# Patient Record
Sex: FEMALE | Race: WHITE | NOT HISPANIC OR LATINO | Employment: UNEMPLOYED | URBAN - METROPOLITAN AREA
[De-identification: names, ages, dates, MRNs, and addresses within clinical notes are randomized per-mention and may not be internally consistent; named-entity substitution may affect disease eponyms.]

---

## 2018-03-20 PROBLEM — S83.412A SPRAIN OF MEDIAL COLLATERAL LIGAMENT OF LEFT KNEE, INITIAL ENCOUNTER: Status: ACTIVE | Noted: 2018-03-20

## 2018-04-26 PROBLEM — S83.412D SPRAIN OF MEDIAL COLLATERAL LIGAMENT OF LEFT KNEE, SUBSEQUENT ENCOUNTER: Status: ACTIVE | Noted: 2018-04-26

## 2020-11-05 DIAGNOSIS — Z01.812 PRE-PROCEDURAL LABORATORY EXAMINATIONS: Primary | ICD-10-CM

## 2020-11-13 ENCOUNTER — LAB SERVICES (OUTPATIENT)
Dept: LAB | Age: 70
End: 2020-11-13

## 2020-11-13 DIAGNOSIS — Z01.812 PRE-PROCEDURAL LABORATORY EXAMINATIONS: ICD-10-CM

## 2020-11-13 PROCEDURE — U0003 INFECTIOUS AGENT DETECTION BY NUCLEIC ACID (DNA OR RNA); SEVERE ACUTE RESPIRATORY SYNDROME CORONAVIRUS 2 (SARS-COV-2) (CORONAVIRUS DISEASE [COVID-19]), AMPLIFIED PROBE TECHNIQUE, MAKING USE OF HIGH THROUGHPUT TECHNOLOGIES AS DESCRIBED BY CMS-2020-01-R: HCPCS | Performed by: INTERNAL MEDICINE

## 2020-11-15 LAB
SARS-COV-2 RNA RESP QL NAA+PROBE: NOT DETECTED
SERVICE CMNT-IMP: NORMAL
SPECIMEN SOURCE: NORMAL

## 2020-11-16 ENCOUNTER — ANESTHESIA EVENT (OUTPATIENT)
Dept: GASTROENTEROLOGY | Age: 70
End: 2020-11-16

## 2020-11-16 ENCOUNTER — ANESTHESIA (OUTPATIENT)
Dept: GASTROENTEROLOGY | Age: 70
End: 2020-11-16

## 2020-11-16 ENCOUNTER — HOSPITAL ENCOUNTER (OUTPATIENT)
Dept: GASTROENTEROLOGY | Age: 70
Discharge: HOME | End: 2020-11-16
Attending: INTERNAL MEDICINE

## 2020-11-16 VITALS
HEART RATE: 66 BPM | DIASTOLIC BLOOD PRESSURE: 78 MMHG | HEIGHT: 64 IN | WEIGHT: 165 LBS | RESPIRATION RATE: 18 BRPM | TEMPERATURE: 96.8 F | BODY MASS INDEX: 28.17 KG/M2 | SYSTOLIC BLOOD PRESSURE: 154 MMHG | OXYGEN SATURATION: 100 %

## 2020-11-16 DIAGNOSIS — Z12.11 SPECIAL SCREENING FOR MALIGNANT NEOPLASMS, COLON: ICD-10-CM

## 2020-11-16 PROCEDURE — 10002800 HB RX 250 W HCPCS: Performed by: STUDENT IN AN ORGANIZED HEALTH CARE EDUCATION/TRAINING PROGRAM

## 2020-11-16 PROCEDURE — 10002807 HB RX 258: Performed by: STUDENT IN AN ORGANIZED HEALTH CARE EDUCATION/TRAINING PROGRAM

## 2020-11-16 PROCEDURE — 10002801 HB RX 250 W/O HCPCS: Performed by: STUDENT IN AN ORGANIZED HEALTH CARE EDUCATION/TRAINING PROGRAM

## 2020-11-16 PROCEDURE — 13000001 HB PHASE II RECOVERY EA 30 MINUTES

## 2020-11-16 PROCEDURE — 13000028 HB GI MAJOR COMPLEX CASE S/U + 1ST 15 MIN

## 2020-11-16 PROCEDURE — 13000029 HB GI MAJOR COMPLEX CASE EA ADD MINUTE

## 2020-11-16 PROCEDURE — 88305 TISSUE EXAM BY PATHOLOGIST: CPT

## 2020-11-16 PROCEDURE — 88342 IMHCHEM/IMCYTCHM 1ST ANTB: CPT

## 2020-11-16 PROCEDURE — 13000008 HB ANESTHESIA MAC OUTSIDE OR

## 2020-11-16 RX ORDER — MONTELUKAST SODIUM 4 MG/1
4 TABLET, CHEWABLE ORAL NIGHTLY
COMMUNITY

## 2020-11-16 RX ORDER — ACETAMINOPHEN 325 MG/1
650 TABLET ORAL EVERY 4 HOURS PRN
Status: DISCONTINUED | OUTPATIENT
Start: 2020-11-16 | End: 2020-11-18 | Stop reason: HOSPADM

## 2020-11-16 RX ORDER — SODIUM CHLORIDE 9 MG/ML
INJECTION, SOLUTION INTRAVENOUS CONTINUOUS PRN
Status: DISCONTINUED | OUTPATIENT
Start: 2020-11-16 | End: 2020-11-16

## 2020-11-16 RX ORDER — LIDOCAINE HYDROCHLORIDE 20 MG/ML
INJECTION, SOLUTION INFILTRATION; PERINEURAL PRN
Status: DISCONTINUED | OUTPATIENT
Start: 2020-11-16 | End: 2020-11-16

## 2020-11-16 RX ORDER — PROPOFOL 10 MG/ML
INJECTION, EMULSION INTRAVENOUS PRN
Status: DISCONTINUED | OUTPATIENT
Start: 2020-11-16 | End: 2020-11-16

## 2020-11-16 RX ORDER — ACETAMINOPHEN 650 MG/1
650 SUPPOSITORY RECTAL EVERY 4 HOURS PRN
Status: DISCONTINUED | OUTPATIENT
Start: 2020-11-16 | End: 2020-11-18 | Stop reason: HOSPADM

## 2020-11-16 RX ORDER — SODIUM CHLORIDE, SODIUM LACTATE, POTASSIUM CHLORIDE, CALCIUM CHLORIDE 600; 310; 30; 20 MG/100ML; MG/100ML; MG/100ML; MG/100ML
10 INJECTION, SOLUTION INTRAVENOUS CONTINUOUS
Status: DISCONTINUED | OUTPATIENT
Start: 2020-11-16 | End: 2020-11-18 | Stop reason: HOSPADM

## 2020-11-16 RX ADMIN — SODIUM CHLORIDE: 9 INJECTION, SOLUTION INTRAVENOUS at 07:51

## 2020-11-16 RX ADMIN — PROPOFOL 50 MG: 10 INJECTION, EMULSION INTRAVENOUS at 08:01

## 2020-11-16 RX ADMIN — LIDOCAINE HYDROCHLORIDE 3 ML: 20 INJECTION, SOLUTION INFILTRATION; PERINEURAL at 08:01

## 2020-11-16 RX ADMIN — PROPOFOL 150 MCG/KG/MIN: 10 INJECTION, EMULSION INTRAVENOUS at 08:01

## 2020-11-16 ASSESSMENT — ACTIVITIES OF DAILY LIVING (ADL)
ADL_BEFORE_ADMISSION: INDEPENDENT
ADL_SCORE: 12
HISTORY OF FALLING IN THE LAST YEAR (PRIOR TO ADMISSION): NO

## 2020-11-16 ASSESSMENT — ENCOUNTER SYMPTOMS: EXERCISE TOLERANCE: GOOD (>4 METS)

## 2020-11-16 ASSESSMENT — PAIN SCALES - GENERAL
PAINLEVEL_OUTOF10: 0

## 2020-11-17 LAB — PATHOLOGIST NAME: NORMAL

## 2021-11-06 ENCOUNTER — HOSPITAL ENCOUNTER (OUTPATIENT)
Age: 71
Discharge: HOME | End: 2021-11-06
Payer: MEDICARE

## 2021-11-06 VITALS
BODY MASS INDEX: 27.83 KG/M2 | HEIGHT: 64 IN | TEMPERATURE: 98 F | RESPIRATION RATE: 20 BRPM | WEIGHT: 163 LBS | DIASTOLIC BLOOD PRESSURE: 69 MMHG | OXYGEN SATURATION: 100 % | HEART RATE: 77 BPM | SYSTOLIC BLOOD PRESSURE: 144 MMHG

## 2021-11-06 DIAGNOSIS — R53.83 FATIGUE, UNSPECIFIED TYPE: Primary | ICD-10-CM

## 2021-11-06 DIAGNOSIS — U07.1 COVID-19: ICD-10-CM

## 2021-11-06 PROCEDURE — 99214 OFFICE O/P EST MOD 30 MIN: CPT | Performed by: PHYSICIAN ASSISTANT

## 2021-11-06 PROCEDURE — M0243 CASIRIVI AND IMDEVI INFUSION: HCPCS | Performed by: PHYSICIAN ASSISTANT

## 2021-11-06 RX ORDER — ATORVASTATIN CALCIUM 10 MG/1
TABLET, FILM COATED ORAL
COMMUNITY
Start: 2021-07-15

## 2021-11-06 RX ORDER — FLUTICASONE FUROATE AND VILANTEROL TRIFENATATE 100; 25 UG/1; UG/1
1 POWDER RESPIRATORY (INHALATION) AS DIRECTED
COMMUNITY

## 2021-11-06 NOTE — ED QUICK NOTES
Patient tolerated infusion without any signs of allergic reaction. Patient discharged home in good condition.

## 2021-11-06 NOTE — ED PROVIDER NOTES
Patient Seen in: [facility]      History   Patient presents with:  Cough/URI    Stated Complaint: Covid    Subjective:   HPI    71 yo female here for polyclonal antibody infusion.   Patient has been experiencing mild URI symptoms for the last Cervical back: Normal range of motion. Skin:     General: Skin is warm. Neurological:      General: No focal deficit present. Mental Status: She is alert and oriented to person, place, and time.    Psychiatric:         Mood and Affect: Mood normal. List

## 2021-11-06 NOTE — ED INITIAL ASSESSMENT (HPI)
Patient is here with nasal congestion and a cough for the last five days and was tested positive for covid yesterday.

## 2023-02-17 ENCOUNTER — HOSPITAL ENCOUNTER (OUTPATIENT)
Age: 73
Discharge: HOME | End: 2023-02-17
Payer: MEDICARE

## 2023-02-17 ENCOUNTER — APPOINTMENT (OUTPATIENT)
Dept: GENERAL RADIOLOGY | Age: 73
End: 2023-02-17
Attending: NURSE PRACTITIONER
Payer: MEDICARE

## 2023-02-17 VITALS
TEMPERATURE: 99 F | DIASTOLIC BLOOD PRESSURE: 58 MMHG | HEIGHT: 64.5 IN | HEART RATE: 76 BPM | OXYGEN SATURATION: 100 % | RESPIRATION RATE: 18 BRPM | BODY MASS INDEX: 27.32 KG/M2 | SYSTOLIC BLOOD PRESSURE: 150 MMHG | WEIGHT: 162 LBS

## 2023-02-17 DIAGNOSIS — S93.402A MILD SPRAIN OF LEFT ANKLE, INITIAL ENCOUNTER: ICD-10-CM

## 2023-02-17 DIAGNOSIS — M25.579 ANKLE PAIN: Primary | ICD-10-CM

## 2023-02-17 PROCEDURE — 73610 X-RAY EXAM OF ANKLE: CPT | Performed by: NURSE PRACTITIONER

## 2023-02-17 PROCEDURE — 99213 OFFICE O/P EST LOW 20 MIN: CPT | Performed by: NURSE PRACTITIONER

## 2023-02-17 NOTE — ED INITIAL ASSESSMENT (HPI)
Pt reports rolling left ankle last Saturday. initially applied ice. Using heat now. States the ankle is sore, using compression wraps. Patient went back to work, increased normal activity. Here for xray.

## 2023-05-11 ENCOUNTER — HOSPITAL ENCOUNTER (EMERGENCY)
Facility: HOSPITAL | Age: 73
Discharge: HOME | End: 2023-05-11
Attending: EMERGENCY MEDICINE
Payer: MEDICARE

## 2023-05-11 VITALS
RESPIRATION RATE: 20 BRPM | HEART RATE: 66 BPM | HEIGHT: 65 IN | DIASTOLIC BLOOD PRESSURE: 63 MMHG | WEIGHT: 157 LBS | SYSTOLIC BLOOD PRESSURE: 153 MMHG | TEMPERATURE: 99 F | BODY MASS INDEX: 26.16 KG/M2 | OXYGEN SATURATION: 98 %

## 2023-05-11 DIAGNOSIS — I80.8 SUPERFICIAL THROMBOPHLEBITIS OF LEFT UPPER EXTREMITY: Primary | ICD-10-CM

## 2023-05-11 LAB
ANION GAP SERPL CALC-SCNC: 6 MMOL/L (ref 0–18)
BASOPHILS # BLD AUTO: 0.08 X10(3) UL (ref 0–0.2)
BASOPHILS NFR BLD AUTO: 0.9 %
BUN BLD-MCNC: 12 MG/DL (ref 7–18)
BUN/CREAT SERPL: 16 (ref 10–20)
CALCIUM BLD-MCNC: 8.5 MG/DL (ref 8.5–10.1)
CHLORIDE SERPL-SCNC: 112 MMOL/L (ref 98–112)
CO2 SERPL-SCNC: 23 MMOL/L (ref 21–32)
CREAT BLD-MCNC: 0.75 MG/DL
DEPRECATED RDW RBC AUTO: 54.7 FL (ref 35.1–46.3)
EOSINOPHIL # BLD AUTO: 0.44 X10(3) UL (ref 0–0.7)
EOSINOPHIL NFR BLD AUTO: 5 %
ERYTHROCYTE [DISTWIDTH] IN BLOOD BY AUTOMATED COUNT: 21.3 % (ref 11–15)
GFR SERPLBLD BASED ON 1.73 SQ M-ARVRAT: 85 ML/MIN/1.73M2 (ref 60–?)
GLUCOSE BLD-MCNC: 100 MG/DL (ref 70–99)
HCT VFR BLD AUTO: 32.3 %
HGB BLD-MCNC: 9.4 G/DL
IMM GRANULOCYTES # BLD AUTO: 0.04 X10(3) UL (ref 0–1)
IMM GRANULOCYTES NFR BLD: 0.5 %
LYMPHOCYTES # BLD AUTO: 1.39 X10(3) UL (ref 1–4)
LYMPHOCYTES NFR BLD AUTO: 15.9 %
MCH RBC QN AUTO: 21.8 PG (ref 26–34)
MCHC RBC AUTO-ENTMCNC: 29.1 G/DL (ref 31–37)
MCV RBC AUTO: 74.8 FL
MONOCYTES # BLD AUTO: 1.19 X10(3) UL (ref 0.1–1)
MONOCYTES NFR BLD AUTO: 13.6 %
NEUTROPHILS # BLD AUTO: 5.59 X10 (3) UL (ref 1.5–7.7)
NEUTROPHILS # BLD AUTO: 5.59 X10(3) UL (ref 1.5–7.7)
NEUTROPHILS NFR BLD AUTO: 64.1 %
OSMOLALITY SERPL CALC.SUM OF ELEC: 292 MOSM/KG (ref 275–295)
PLATELET # BLD AUTO: 270 10(3)UL (ref 150–450)
POTASSIUM SERPL-SCNC: 3.6 MMOL/L (ref 3.5–5.1)
RBC # BLD AUTO: 4.32 X10(6)UL
SODIUM SERPL-SCNC: 141 MMOL/L (ref 136–145)
WBC # BLD AUTO: 8.7 X10(3) UL (ref 4–11)

## 2023-05-11 PROCEDURE — 80048 BASIC METABOLIC PNL TOTAL CA: CPT | Performed by: EMERGENCY MEDICINE

## 2023-05-11 PROCEDURE — 85025 COMPLETE CBC W/AUTO DIFF WBC: CPT | Performed by: EMERGENCY MEDICINE

## 2023-05-11 PROCEDURE — 85025 COMPLETE CBC W/AUTO DIFF WBC: CPT

## 2023-05-11 PROCEDURE — 99284 EMERGENCY DEPT VISIT MOD MDM: CPT

## 2023-05-11 PROCEDURE — 99283 EMERGENCY DEPT VISIT LOW MDM: CPT

## 2023-05-11 PROCEDURE — 36415 COLL VENOUS BLD VENIPUNCTURE: CPT

## 2023-05-11 PROCEDURE — 80048 BASIC METABOLIC PNL TOTAL CA: CPT

## 2023-05-11 RX ORDER — CEPHALEXIN 500 MG/1
500 CAPSULE ORAL 3 TIMES DAILY
Qty: 21 CAPSULE | Refills: 0 | Status: SHIPPED | OUTPATIENT
Start: 2023-05-11 | End: 2023-05-18

## 2023-05-11 NOTE — ED INITIAL ASSESSMENT (HPI)
Pt dc last night from [address] [facility] hospital for blood transfusions, reports swelling/redness to L arm around previous IV site. Denies CP/ERIC  No further complaints. A/ox4, respirations unlabored, speech full/clear, gait steady, no acute distress noted.

## 2024-07-31 ENCOUNTER — LAB ENCOUNTER (OUTPATIENT)
Dept: LAB | Facility: HOSPITAL | Age: 74
End: 2024-07-31
Attending: INTERNAL MEDICINE
Payer: MEDICARE

## 2024-07-31 ENCOUNTER — ORDER TRANSCRIPTION (OUTPATIENT)
Dept: ADMINISTRATIVE | Facility: HOSPITAL | Age: 74
End: 2024-07-31

## 2024-07-31 DIAGNOSIS — K92.1 MELENA: Primary | ICD-10-CM

## 2024-07-31 DIAGNOSIS — Z13.6 SCREENING FOR CARDIOVASCULAR CONDITION: Primary | ICD-10-CM

## 2024-07-31 LAB
BASOPHILS # BLD AUTO: 0.03 X10(3) UL (ref 0–0.2)
BASOPHILS NFR BLD AUTO: 0.6 %
DEPRECATED RDW RBC AUTO: 56 FL (ref 35.1–46.3)
EOSINOPHIL # BLD AUTO: 0.15 X10(3) UL (ref 0–0.7)
EOSINOPHIL NFR BLD AUTO: 2.9 %
ERYTHROCYTE [DISTWIDTH] IN BLOOD BY AUTOMATED COUNT: 18.5 % (ref 11–15)
HCT VFR BLD AUTO: 33.9 %
HGB BLD-MCNC: 9.8 G/DL
IMM GRANULOCYTES # BLD AUTO: 0.01 X10(3) UL (ref 0–1)
IMM GRANULOCYTES NFR BLD: 0.2 %
LYMPHOCYTES # BLD AUTO: 1.27 X10(3) UL (ref 1–4)
LYMPHOCYTES NFR BLD AUTO: 24.1 %
MCH RBC QN AUTO: 25.7 PG (ref 26–34)
MCHC RBC AUTO-ENTMCNC: 28.9 G/DL (ref 31–37)
MCV RBC AUTO: 89 FL
MONOCYTES # BLD AUTO: 0.63 X10(3) UL (ref 0.1–1)
MONOCYTES NFR BLD AUTO: 12 %
NEUTROPHILS # BLD AUTO: 3.17 X10 (3) UL (ref 1.5–7.7)
NEUTROPHILS # BLD AUTO: 3.17 X10(3) UL (ref 1.5–7.7)
NEUTROPHILS NFR BLD AUTO: 60.2 %
PLATELET # BLD AUTO: 312 10(3)UL (ref 150–450)
RBC # BLD AUTO: 3.81 X10(6)UL
WBC # BLD AUTO: 5.3 X10(3) UL (ref 4–11)

## 2024-07-31 PROCEDURE — 36415 COLL VENOUS BLD VENIPUNCTURE: CPT

## 2024-07-31 PROCEDURE — 85025 COMPLETE CBC W/AUTO DIFF WBC: CPT

## 2024-08-01 ENCOUNTER — TELEPHONE (OUTPATIENT)
Age: 74
End: 2024-08-01

## 2024-08-01 NOTE — TELEPHONE ENCOUNTER
Hello,    Sorry I missed you - I am reaching out from the [facility] Navigation department, following up on an order from your provider's office to assist in connecting you with resources for care. If you would like to discuss this further, please give us a call back at [phone], or for more immediate assistance you can contact our 24-hour help line at [phone]. We look forward to hearing from you soon.

## 2024-08-01 NOTE — TELEPHONE ENCOUNTER
Omayra,   Thank you for speaking with me today. Below are the behavioral health providers I think might be a good fit and that are showing in network with your insurance. Please call the provider directly to schedule an appointment. If distance is a concern please inquire on virtual service options.     [facility] , Kellee Escobar LCPC;PITER; [place]  [phone]  [email or web address]  [address], [address] [address]  Kellee Escobar PsyD, NELLIE, PITER - [facility] ([employer])      [facility] , Sejal Kim, [facility]  [phone]  [email or web address]  [address], [address]  [place] [address]  [facility] - caring  experienced  relevant        Warm regards,     If any safety concerns arise it is advised to call 911, go to the nearest emergency room. [facility] crisis walk-in facility is located at [address] [facility] in [place]. Contact Number for [facility] is [phone].    Danni Rico LCPC  (she/her/hers)  Lead Patient Care Navigator Mental Health   [facility]    [email or web address]  [phone]

## 2024-08-15 ENCOUNTER — HOSPITAL ENCOUNTER (OUTPATIENT)
Dept: CT IMAGING | Age: 74
Discharge: HOME | End: 2024-08-15
Attending: INTERNAL MEDICINE

## 2024-08-15 DIAGNOSIS — Z13.6 SCREENING FOR CARDIOVASCULAR CONDITION: ICD-10-CM

## 2024-08-21 ENCOUNTER — LAB ENCOUNTER (OUTPATIENT)
Dept: LAB | Facility: HOSPITAL | Age: 74
End: 2024-08-21
Attending: INTERNAL MEDICINE
Payer: MEDICARE

## 2024-08-21 DIAGNOSIS — D50.0 IRON DEFICIENCY ANEMIA SECONDARY TO BLOOD LOSS (CHRONIC): Primary | ICD-10-CM

## 2024-08-21 LAB
HCT VFR BLD AUTO: 37 %
HGB BLD-MCNC: 11.1 G/DL

## 2024-08-21 PROCEDURE — 36415 COLL VENOUS BLD VENIPUNCTURE: CPT

## 2024-08-21 PROCEDURE — 85018 HEMOGLOBIN: CPT

## 2024-08-21 PROCEDURE — 85014 HEMATOCRIT: CPT

## 2024-08-23 ENCOUNTER — HOSPITAL ENCOUNTER (OUTPATIENT)
Dept: MAMMOGRAPHY | Facility: HOSPITAL | Age: 74
Discharge: HOME | End: 2024-08-23
Attending: INTERNAL MEDICINE
Payer: MEDICARE

## 2024-08-23 DIAGNOSIS — Z12.31 ENCOUNTER FOR MAMMOGRAM TO ESTABLISH BASELINE MAMMOGRAM: ICD-10-CM

## 2024-08-23 PROCEDURE — 77067 SCR MAMMO BI INCL CAD: CPT | Performed by: INTERNAL MEDICINE

## 2024-08-23 PROCEDURE — 77063 BREAST TOMOSYNTHESIS BI: CPT | Performed by: INTERNAL MEDICINE

## 2024-10-23 ENCOUNTER — LAB ENCOUNTER (OUTPATIENT)
Dept: LAB | Facility: HOSPITAL | Age: 74
End: 2024-10-23
Attending: INTERNAL MEDICINE
Payer: MEDICARE

## 2024-10-23 DIAGNOSIS — D50.0 IRON DEFICIENCY ANEMIA SECONDARY TO BLOOD LOSS (CHRONIC): Primary | ICD-10-CM

## 2024-10-23 LAB
HCT VFR BLD AUTO: 39 %
HGB BLD-MCNC: 13.1 G/DL

## 2024-10-23 PROCEDURE — 36415 COLL VENOUS BLD VENIPUNCTURE: CPT

## 2024-10-23 PROCEDURE — 85014 HEMATOCRIT: CPT

## 2024-10-23 PROCEDURE — 85018 HEMOGLOBIN: CPT

## 2024-11-29 ENCOUNTER — HOSPITAL ENCOUNTER (OUTPATIENT)
Dept: BONE DENSITY | Facility: HOSPITAL | Age: 74
Discharge: HOME | End: 2024-11-29
Attending: INTERNAL MEDICINE
Payer: MEDICARE

## 2024-11-29 DIAGNOSIS — M81.8: ICD-10-CM

## 2024-11-29 PROCEDURE — 77080 DXA BONE DENSITY AXIAL: CPT | Performed by: INTERNAL MEDICINE

## 2025-01-22 ENCOUNTER — LAB ENCOUNTER (OUTPATIENT)
Dept: LAB | Facility: HOSPITAL | Age: 75
End: 2025-01-22
Attending: INTERNAL MEDICINE
Payer: MEDICARE

## 2025-01-22 DIAGNOSIS — E78.00 PURE HYPERCHOLESTEROLEMIA: Primary | ICD-10-CM

## 2025-01-22 DIAGNOSIS — E55.9 VITAMIN D DEFICIENCY: ICD-10-CM

## 2025-01-22 LAB
ALBUMIN SERPL-MCNC: 4.9 G/DL (ref 3.2–4.8)
ALBUMIN/GLOB SERPL: 1.9 {RATIO} (ref 1–2)
ALP LIVER SERPL-CCNC: 91 U/L
ALT SERPL-CCNC: 15 U/L
ANION GAP SERPL CALC-SCNC: 8 MMOL/L (ref 0–18)
AST SERPL-CCNC: 20 U/L (ref ?–34)
BASOPHILS # BLD AUTO: 0.05 X10(3) UL (ref 0–0.2)
BASOPHILS NFR BLD AUTO: 1 %
BILIRUB SERPL-MCNC: 1.3 MG/DL (ref 0.2–1.1)
BUN BLD-MCNC: 20 MG/DL (ref 9–23)
BUN/CREAT SERPL: 23 (ref 10–20)
CALCIUM BLD-MCNC: 9.9 MG/DL (ref 8.7–10.4)
CHLORIDE SERPL-SCNC: 106 MMOL/L (ref 98–112)
CHOLEST SERPL-MCNC: 168 MG/DL (ref ?–200)
CO2 SERPL-SCNC: 28 MMOL/L (ref 21–32)
CREAT BLD-MCNC: 0.87 MG/DL
DEPRECATED RDW RBC AUTO: 46.5 FL (ref 35.1–46.3)
EGFRCR SERPLBLD CKD-EPI 2021: 70 ML/MIN/1.73M2 (ref 60–?)
EOSINOPHIL # BLD AUTO: 0.26 X10(3) UL (ref 0–0.7)
EOSINOPHIL NFR BLD AUTO: 5.1 %
ERYTHROCYTE [DISTWIDTH] IN BLOOD BY AUTOMATED COUNT: 12.9 % (ref 11–15)
FASTING PATIENT LIPID ANSWER: YES
FASTING STATUS PATIENT QL REPORTED: YES
GLOBULIN PLAS-MCNC: 2.6 G/DL (ref 2–3.5)
GLUCOSE BLD-MCNC: 122 MG/DL (ref 70–99)
HCT VFR BLD AUTO: 41 %
HDLC SERPL-MCNC: 72 MG/DL (ref 40–59)
HGB BLD-MCNC: 13.2 G/DL
IMM GRANULOCYTES # BLD AUTO: 0.02 X10(3) UL (ref 0–1)
IMM GRANULOCYTES NFR BLD: 0.4 %
LDLC SERPL CALC-MCNC: 82 MG/DL (ref ?–100)
LYMPHOCYTES # BLD AUTO: 1.3 X10(3) UL (ref 1–4)
LYMPHOCYTES NFR BLD AUTO: 25.6 %
MCH RBC QN AUTO: 31.8 PG (ref 26–34)
MCHC RBC AUTO-ENTMCNC: 32.2 G/DL (ref 31–37)
MCV RBC AUTO: 98.8 FL
MONOCYTES # BLD AUTO: 0.65 X10(3) UL (ref 0.1–1)
MONOCYTES NFR BLD AUTO: 12.8 %
NEUTROPHILS # BLD AUTO: 2.79 X10 (3) UL (ref 1.5–7.7)
NEUTROPHILS # BLD AUTO: 2.79 X10(3) UL (ref 1.5–7.7)
NEUTROPHILS NFR BLD AUTO: 55.1 %
NONHDLC SERPL-MCNC: 96 MG/DL (ref ?–130)
OSMOLALITY SERPL CALC.SUM OF ELEC: 298 MOSM/KG (ref 275–295)
PLATELET # BLD AUTO: 277 10(3)UL (ref 150–450)
POTASSIUM SERPL-SCNC: 5 MMOL/L (ref 3.5–5.1)
PROT SERPL-MCNC: 7.5 G/DL (ref 5.7–8.2)
RBC # BLD AUTO: 4.15 X10(6)UL
SODIUM SERPL-SCNC: 142 MMOL/L (ref 136–145)
TRIGL SERPL-MCNC: 71 MG/DL (ref 30–149)
TSI SER-ACNC: 1.24 UIU/ML (ref 0.55–4.78)
VIT D+METAB SERPL-MCNC: 21.5 NG/ML (ref 30–100)
VLDLC SERPL CALC-MCNC: 11 MG/DL (ref 0–30)
WBC # BLD AUTO: 5.1 X10(3) UL (ref 4–11)

## 2025-01-22 PROCEDURE — 82306 VITAMIN D 25 HYDROXY: CPT

## 2025-01-22 PROCEDURE — 36415 COLL VENOUS BLD VENIPUNCTURE: CPT

## 2025-01-22 PROCEDURE — 80061 LIPID PANEL: CPT

## 2025-01-22 PROCEDURE — 84443 ASSAY THYROID STIM HORMONE: CPT

## 2025-01-22 PROCEDURE — 80053 COMPREHEN METABOLIC PANEL: CPT

## 2025-01-22 PROCEDURE — 85025 COMPLETE CBC W/AUTO DIFF WBC: CPT
